# Patient Record
Sex: MALE | Race: WHITE
[De-identification: names, ages, dates, MRNs, and addresses within clinical notes are randomized per-mention and may not be internally consistent; named-entity substitution may affect disease eponyms.]

---

## 2018-09-14 ENCOUNTER — HOSPITAL ENCOUNTER (INPATIENT)
Dept: HOSPITAL 92 - SCSER | Age: 28
LOS: 3 days | Discharge: HOME | DRG: 394 | End: 2018-09-17
Attending: SURGERY | Admitting: SURGERY
Payer: COMMERCIAL

## 2018-09-14 VITALS — BODY MASS INDEX: 28.7 KG/M2

## 2018-09-14 DIAGNOSIS — K56.609: ICD-10-CM

## 2018-09-14 DIAGNOSIS — Z88.8: ICD-10-CM

## 2018-09-14 DIAGNOSIS — T18.3XXA: Primary | ICD-10-CM

## 2018-09-14 LAB
ALBUMIN SERPL BCG-MCNC: 4.5 G/DL (ref 3.5–5)
ALP SERPL-CCNC: 44 U/L (ref 40–150)
ALT SERPL W P-5'-P-CCNC: 31 U/L (ref 8–55)
ANION GAP SERPL CALC-SCNC: 14 MMOL/L (ref 10–20)
AST SERPL-CCNC: 25 U/L (ref 5–34)
BASOPHILS # BLD AUTO: 0.1 THOU/UL (ref 0–0.2)
BASOPHILS NFR BLD AUTO: 1.1 % (ref 0–1)
BILIRUB SERPL-MCNC: 0.4 MG/DL (ref 0.2–1.2)
BUN SERPL-MCNC: 20 MG/DL (ref 8.9–20.6)
CALCIUM SERPL-MCNC: 9.7 MG/DL (ref 7.8–10.44)
CHLORIDE SERPL-SCNC: 107 MMOL/L (ref 98–107)
CO2 SERPL-SCNC: 24 MMOL/L (ref 22–29)
CREAT CL PREDICTED SERPL C-G-VRATE: 0 ML/MIN (ref 70–130)
EOSINOPHIL # BLD AUTO: 0.3 THOU/UL (ref 0–0.7)
EOSINOPHIL NFR BLD AUTO: 3.7 % (ref 0–10)
GLOBULIN SER CALC-MCNC: 2.7 G/DL (ref 2.4–3.5)
GLUCOSE SERPL-MCNC: 105 MG/DL (ref 70–105)
HGB BLD-MCNC: 15 G/DL (ref 14–18)
LIPASE SERPL-CCNC: 14 U/L (ref 8–78)
LYMPHOCYTES # BLD: 2 THOU/UL (ref 1.2–3.4)
LYMPHOCYTES NFR BLD AUTO: 27.1 % (ref 21–51)
MCH RBC QN AUTO: 30.1 PG (ref 27–31)
MCV RBC AUTO: 85.3 FL (ref 78–98)
MONOCYTES # BLD AUTO: 0.6 THOU/UL (ref 0.11–0.59)
MONOCYTES NFR BLD AUTO: 8.2 % (ref 0–10)
NEUTROPHILS # BLD AUTO: 4.4 THOU/UL (ref 1.4–6.5)
NEUTROPHILS NFR BLD AUTO: 60 % (ref 42–75)
PLATELET # BLD AUTO: 290 THOU/UL (ref 130–400)
POTASSIUM SERPL-SCNC: 3.8 MMOL/L (ref 3.5–5.1)
RBC # BLD AUTO: 5 MILL/UL (ref 4.7–6.1)
SODIUM SERPL-SCNC: 141 MMOL/L (ref 136–145)
WBC # BLD AUTO: 7.4 THOU/UL (ref 4.8–10.8)

## 2018-09-14 PROCEDURE — 85025 COMPLETE CBC W/AUTO DIFF WBC: CPT

## 2018-09-14 PROCEDURE — 96374 THER/PROPH/DIAG INJ IV PUSH: CPT

## 2018-09-14 PROCEDURE — 80053 COMPREHEN METABOLIC PANEL: CPT

## 2018-09-14 PROCEDURE — 96361 HYDRATE IV INFUSION ADD-ON: CPT

## 2018-09-14 PROCEDURE — 80048 BASIC METABOLIC PNL TOTAL CA: CPT

## 2018-09-14 PROCEDURE — 74250 X-RAY XM SM INT 1CNTRST STD: CPT

## 2018-09-14 PROCEDURE — 96375 TX/PRO/DX INJ NEW DRUG ADDON: CPT

## 2018-09-14 PROCEDURE — 74177 CT ABD & PELVIS W/CONTRAST: CPT

## 2018-09-14 PROCEDURE — 96376 TX/PRO/DX INJ SAME DRUG ADON: CPT

## 2018-09-14 PROCEDURE — 83605 ASSAY OF LACTIC ACID: CPT

## 2018-09-14 PROCEDURE — 83690 ASSAY OF LIPASE: CPT

## 2018-09-14 PROCEDURE — 36415 COLL VENOUS BLD VENIPUNCTURE: CPT

## 2018-09-14 PROCEDURE — A4216 STERILE WATER/SALINE, 10 ML: HCPCS

## 2018-09-14 PROCEDURE — 74018 RADEX ABDOMEN 1 VIEW: CPT

## 2018-09-14 RX ADMIN — Medication SCH: at 08:34

## 2018-09-14 RX ADMIN — POTASSIUM CHLORIDE, DEXTROSE MONOHYDRATE AND SODIUM CHLORIDE SCH MLS: 150; 5; 450 INJECTION, SOLUTION INTRAVENOUS at 18:07

## 2018-09-14 RX ADMIN — Medication SCH: at 20:33

## 2018-09-14 NOTE — CT
PRELIMINARY REPORT/[facility]/EMERGENCY AFTER

HOURS PROCEDURE: 

 

EXAM:

CT Abdomen and Pelvis With Intravenous Contrast

 

EXAM DATE/TIME:

9/14/2018 1:31 AM

 

CLINICAL HISTORY:

28 years old, male; Pain; Abdominal pain; Epigastric; Prior surgery; Surgery date: 6+ months; Surgery
 type: Lap bowel decompression 2014; Patient HX: Epigastric and middle abd pain, distention. Hs of mu
ltiple obstructions since birth, seven surgeries but no bowel removed

 

TECHNIQUE:

Axial computed tomography images of the abdomen and pelvis with intravenous contrast. All CT scans at
 this facility use at least one of these dose optimization techniques: automated exposure control; mA
 and/or kV adjustment per patient size (includes targeted exams where dose is

matched to clinical indication); or iterative reconstruction. Coronal reformatted images were created
 and reviewed.

 

CONTRAST:

90 ml of [id number] administered intravenously.

 

COMPARISON:

No relevant prior studies available.

 

FINDINGS:

Lower thorax: The visualized portions of the lung bases are normal.

ABDOMEN:

Liver: Normal. No mass.

Gallbladder and bile ducts: The gallbladder is normal. There is no evidence of biliary ductal dilatio
n.

Pancreas: The pancreas appears normal.

Spleen: The spleen is normal.

Adrenals: The adrenal glands are normal.

Kidneys and ureters: The kidneys appear normal.

Stomach and bowel: The stomach is normal. The duodenum is unremarkable. There is dilatation of the sm
all bowel up to 4.7 cm with abrupt collapse in the RIGHT upper quadrant consistent with small bowel o
bstruction.

Appendix: No appendix is specifically identified. No associated signs to suggest acute appendicitis.

PELVIS:

Bladder: The bladder is normal.

Reproductive: The prostate gland and seminal vesicles are normal.

ABDOMEN and PELVIS:

Intraperitoneal space: Normal. No free air. No significant fluid collection.

Bones/joints: No acute fracture. No dislocation.

Soft tissues: Unremarkable.

Vasculature: Normal. No abdominal aortic aneurysm.

Lymph nodes: Normal. No enlarged lymph nodes.

 

IMPRESSION:

There is dilatation of the small bowel up to 4.7 cm with abrupt collapse in the RIGHT upper quadrant 
consistent with small bowel obstruction.

 

Thank you for allowing us to participate in the care of your patient.

Dictated and Authenticated by: Molina Lopez MD

09/14/2018 3:24 AM Central Time ([place] & [place])

 

 

 

FINAL REPORT 

 

EMERGENT AFTER HOURS CT ABDOMEN AND PELVIS

 

DATE: 9/14/18.

 

HISTORY: 

Gastric abdominal pain.  History of prior surgery for bowel decompression in 2014.  Epigastric and mi
ddle abdominal pain as well as abdominal distention.  History of multiple bowel obstructions.

 

COMPARISON: 

8/3/17.

 

IMPRESSION: 

1.      Partial small bowel obstruction with loops of small bowel measuring up to 4.8 cm.  There is f
eculent-type material within the small bowel just proximal to the area of transition in the right low
er quadrant which could be related to phytobezoar.

2.      No other findings are seen in the abdomen or pelvis.

3.      Findings are in agreement with the preliminary report by [facility].

 

POS: [facility]

## 2018-09-14 NOTE — HP
DATE OF ADMISSION:  09/14/2018

 

CHIEF COMPLAINT:  Nausea, vomiting and abdominal pain.

 

HISTORY OF PRESENT ILLNESS:  The patient is a 25-year-old male who had a congenital malrotation of th
e midgut with Vienna's bands and has had multiple surgeries known as an infant, but throughout his life
, recurrent bowel obstructions.  His last admission was 2-1/2 years ago and he was treated nonoperati
vely.  He was doing well until yesterday afternoon when he started having some nausea, vomiting and a
bdominal pain similar to previous obstructions.  At midnight, he went to the emergency room.  He says
 he had a bowel movement at 8:30, he passed flatus at 9:00 p.m. last night.

 

PAST MEDICAL HISTORY:  Otherwise, healthy.

 

PAST SURGICAL HISTORY:  Multiple small bowel resections, exploratory laparotomies and stricturoplasty
.

 

MEDICATIONS:  He is on no medications.

 

ALLERGIES:  No known drug allergies.  He is allergic to GRASS.

 

SOCIAL HISTORY:  He is a graduate of [employer].  He does not use tobacco or alcohol.

 

FAMILY HISTORY:  He is adopted.

 

PHYSICAL EXAMINATION:

VITAL SIGNS:  Temperature 97.7, pulse 80, blood pressure 123/61.

ABDOMEN:  He has a hypertrophic midline scar.  There are no palpable hernias.  He is distended.  No s
ignificant tenderness.

EXTREMITIES:  Unremarkable.

 

LABORATORY DATA:  White count 7.4, H and H 15 and 42, platelet count 290,000.  Electrolytes are fine.


 

IMAGING:  CT scan shows a very distended stomach with vegetable matter as well as the duodenum with a
 cut off zone in the right upper quadrant.

 

ASSESSMENT:  Bezoar.  I confirmed that with the radiologist.

 

PLAN:  Continue NG suction.  We will consult GI.

## 2018-09-14 NOTE — CON
DATE OF CONSULTATION:  09/14/2018

 

REQUESTING PHYSICIAN:  Dr. Bear.

 

REASON FOR CONSULTATION:  Partial small-bowel obstruction, bezoar in the stomach and duodenum.

 

HISTORY OF PRESENT ILLNESS:  Clay Dennis is a 28-year-old man who has been seen in the past by my [facility] colleague, Dr. Adriano Willis.  He has a complicated history of multiple episodes of small-bowel obstru
ction dating back to infancy.  He has his first surgery at the age of 4 with Pedro's bands released at
 that time, but then throughout life since then has been dealing with intra-abdominal adhesive diseas
e.  Primarily recurrent bowel obstructions have been in the distal small bowel.  At one point, he had
 a strictureplasty and a small segmental distal small bowel resection.  He had multiple exploratory l
aparotomies.  He had been doing okay for several months, but yesterday he started having acute upper 
abdominal pain associated with nausea and vomiting.  His last bowel movement was last night and was n
ormal, but he has not had any bowel movement since that time.  The pain and the nausea became severe 
and he presented to the hospital.  A CT scan of the abdomen and pelvis demonstrates distended stomach
 and duodenum with a food bezoar essentially filling up the stomach and most of the duodenum, in fron
t of partial bowel obstruction.  The patient had a nasogastric tube placed, which has put out 100 mL 
of fluid and he has vomited 3 times today.  He continues to feel nauseated and have abdominal pain.  
He is receiving morphine and requesting Dilaudid.

 

PAST MEDICAL HISTORY:  Otherwise, healthy.

 

PAST SURGICAL HISTORY:  Multiple small bowel resections, exploratory laparotomies and stricturoplasty
.

 

MEDICATIONS:  None.

 

ALLERGIES:  No known drug allergies.

 

SOCIAL HISTORY:  No tobacco or alcohol use.

 

FAMILY HISTORY:  He is adopted.

 

PHYSICAL EXAMINATION:

VITAL SIGNS:  Temperature 97.8, pulse 70, blood pressure 130/73, 98% oxygen saturation on room air.

GENERAL:  A 28-year-old  man sitting up in bed in mild to moderate discomfort from nausea, b
ut no acute distress.

SKIN:  No jaundice, no rashes were palpable.

EYES:  No scleral icterus.  Extraocular movements intact.

ENT:  Mucous membranes moist.  He has a nasogastric tube suctioning out a small amount of thick fluid
.

HEART:  Regular rate and rhythm.

LUNGS:  Clear to auscultation bilaterally.

ABDOMEN:  Mild distention, tympanitic.  Bowel sounds are hypoactive, but present.  Generalized tender
ness to palpation, but no guarding, rebound tenderness.

EXTREMITIES:  No peripheral edema.

VESSELS:  Radial pulses 2+ bilaterally.

NEUROLOGICAL:  Cranial nerves II-XII intact bilaterally.  No focal deficits.

 

LABORATORY STUDIES:  WBC 7.4, hemoglobin 15.0, platelets 290.  Sodium 141, potassium 3.8, BUN 20, cre
atinine 1.24, lactic acid 0.6.  Total bilirubin 0.4, alkaline phosphatase 44, AST 25, ALT 31, lipase 
14, albumin 4.5.

 

ASSESSMENT AND PLAN:

1.  Proximal partial small-bowel obstruction, recurrent.

2.  Bezoar, filling the stomach and most of the duodenum, based on CT scan from earlier today.

 

I had a long discussion with the patient and his mother as well as his father on the speaker phone.  
Unfortunately, it is not really feasible to attempt endoscopic removal of bezoar at this size, which 
appears to be essentially filling up the entire stomach and most of the duodenum.  Rather, I am hopef
ul that this obstructive process will resolve as happened previously and that all of this food materi
al will be able to move distally appropriately.  I cannot really predict how fast this might occur.  
If not, then a surgical approach would be required.  Normally, I would recommend treating with IV Reg
imer to try to stimulate bowel motility and move this bezoar along, but in his case with prior episode
s of real mechanical small-bowel obstruction, this would be a bit higher risk.  But I would recommend
 conservative expectant management rather than anything aggressive at this time.  We will not plan fo
r any endoscopy.  Consider repeat imaging on a daily basis if symptoms persist.

 

Thank you for the consultation.  Please call with questions or concerns.

## 2018-09-15 LAB
ANION GAP SERPL CALC-SCNC: 8 MMOL/L (ref 10–20)
BASOPHILS # BLD AUTO: 0 THOU/UL (ref 0–0.2)
BASOPHILS NFR BLD AUTO: 0.2 % (ref 0–1)
BUN SERPL-MCNC: 11 MG/DL (ref 8.9–20.6)
CALCIUM SERPL-MCNC: 8.3 MG/DL (ref 7.8–10.44)
CHLORIDE SERPL-SCNC: 106 MMOL/L (ref 98–107)
CO2 SERPL-SCNC: 29 MMOL/L (ref 22–29)
CREAT CL PREDICTED SERPL C-G-VRATE: 114 ML/MIN (ref 70–130)
EOSINOPHIL # BLD AUTO: 0.3 THOU/UL (ref 0–0.7)
EOSINOPHIL NFR BLD AUTO: 4.1 % (ref 0–10)
GLUCOSE SERPL-MCNC: 102 MG/DL (ref 70–105)
HGB BLD-MCNC: 13.4 G/DL (ref 14–18)
LYMPHOCYTES # BLD: 1.1 THOU/UL (ref 1.2–3.4)
LYMPHOCYTES NFR BLD AUTO: 17.6 % (ref 21–51)
MCH RBC QN AUTO: 31.7 PG (ref 27–31)
MCV RBC AUTO: 91.4 FL (ref 78–98)
MONOCYTES # BLD AUTO: 0.7 THOU/UL (ref 0.11–0.59)
MONOCYTES NFR BLD AUTO: 10.6 % (ref 0–10)
NEUTROPHILS # BLD AUTO: 4.2 THOU/UL (ref 1.4–6.5)
NEUTROPHILS NFR BLD AUTO: 67.5 % (ref 42–75)
PLATELET # BLD AUTO: 239 THOU/UL (ref 130–400)
POTASSIUM SERPL-SCNC: 3.8 MMOL/L (ref 3.5–5.1)
RBC # BLD AUTO: 4.22 MILL/UL (ref 4.7–6.1)
SODIUM SERPL-SCNC: 139 MMOL/L (ref 136–145)
WBC # BLD AUTO: 6.2 THOU/UL (ref 4.8–10.8)

## 2018-09-15 RX ADMIN — Medication SCH: at 10:07

## 2018-09-15 RX ADMIN — Medication SCH: at 20:58

## 2018-09-15 RX ADMIN — POTASSIUM CHLORIDE, DEXTROSE MONOHYDRATE AND SODIUM CHLORIDE SCH MLS: 150; 5; 450 INJECTION, SOLUTION INTRAVENOUS at 17:46

## 2018-09-15 RX ADMIN — POTASSIUM CHLORIDE, DEXTROSE MONOHYDRATE AND SODIUM CHLORIDE SCH MLS: 150; 5; 450 INJECTION, SOLUTION INTRAVENOUS at 10:32

## 2018-09-15 RX ADMIN — POTASSIUM CHLORIDE, DEXTROSE MONOHYDRATE AND SODIUM CHLORIDE SCH MLS: 150; 5; 450 INJECTION, SOLUTION INTRAVENOUS at 02:14

## 2018-09-15 NOTE — RAD
ABDOMINAL RADIOGRAPH:

 

Date:  09/15/18 

 

PROVIDED CLINICAL HISTORY:   

Small bowel obstruction. 

 

FINDINGS:

The abdominal bowel gas pattern is nonspecific. Enteric catheter overlies the left upper quadrant. Th
e supine nature of the examination is not sensitive for the detection of pneumoperitoneum. No suspici
ous calcifications. 

 

IMPRESSION: 

Nonspecific bowel gas pattern. 

 

 

POS: [facility]

## 2018-09-15 NOTE — PRG
DATE OF SERVICE:  09/15/2018

 

SUBJECTIVE:  Mr. Dennis does feel better today.  He is less bloated.  NG output is down.

 

OBJECTIVE:

VITAL SIGNS:  He is afebrile.  Vital signs are stable.  _____ gastric drainage overnight.

ABDOMEN:  Soft, it is minimally distended, very little tenderness.  Decreased bowel sounds throughout
.  Well-healed incision without hernia.

 

LABORATORY DATA:  White cell count is 6, hemoglobin 13, creatinine 1.1.  Sodium 139, potassium is 3.8
.

 

ASSESSMENT:  Small-bowel obstruction, question secondary to phytobezoar.

 

PLAN:  Gastrografin small bowel follow through today.  The family notes that in previous hospitalizat
ions, he has required minimal oil as an adjunct to that.  We will attempt without first, but use it i
f we need to.  Hopeful that his phytobezoar can be flushed through with Gastrografin.

## 2018-09-15 NOTE — RAD
GASTROGRAFIN SMALL BOWEL FOLLOW THROUGH:

 

Date:  09/15/18 

 

PROVIDED CLINICAL HISTORY:   

Small bowel obstruction. 

 

FINDINGS:

 radiograph demonstrates a nonspecific bowel gas pattern. Subsequent to the administration of or
al contrast material, there is rapid transit of contrast material from the stomach, with opacificatio
n of the colon by the 30 minute image. There is no nicanor small bowel dilatation apparent. 

 

IMPRESSION: 

No evidence for small bowel obstruction. 

 

 

POS: [facility]

## 2018-09-15 NOTE — PRG
DATE OF SERVICE:  09/15/2018

 

SUBJECTIVE:  Mr. Dennis is feeling a lot better this morning.  He had no further vomiting after yest
erday.  He continued to pass some gas.  He woke up this morning with no nausea or abdominal pain.  He
 did have a small bowel follow through earlier today.  The report just came back.  The small bowel fo
llow through was essentially normal.  There is no evidence of obstruction.  No clear evidence of any 
residual food matter.

 

OBJECTIVE:

VITAL SIGNS:  Temperature 98.0, pulse 69, blood pressure 123/77, 96% oxygen saturation on room air.

GENERAL:  No acute distress.  Nasogastric tube remains in place, clamped.

HEART:  Regular rate and rhythm.

LUNGS:  Clear to auscultation bilaterally.

ABDOMEN:  The patient does have bowel sounds in all 4 quadrants, a bit hypoactive, but present.  Soft
 and nontender to palpation.

EXTREMITIES:  No peripheral edema.

 

LABORATORY STUDIES:  WBC 6.2, hemoglobin 13.4, platelets 239.  Sodium 139, potassium 3.8, BUN 11, cre
atinine 1.10.  Calcium 8.3.

 

IMAGING STUDIES:  Small bowel follow through from earlier today demonstrated nonspecific bowel gas pa
ttern.  There is rapid transit of contrast material from the stomach with opacification of the colon 
by the 30-minute image.  There is no nicanor small bowel dilation apparent on my review of the images, 
I do not see any filling defect suggestive of retained food material.

 

ASSESSMENT AND PLAN:

1.  Small-bowel obstruction, appears to have resolved.

2.  Bezoar, appears to have passed.  The patient is doing a lot better and this is consistent with 
all bowel follow through findings from today.  I think his diet could be started.  We would start wit
h clear liquids and advance as tolerated.  Hopefully, if he is tolerating food, he will be able to be
 discharged from the hospital in the near future.  I discussed with Dr. Fay as well.  Going forwa
rd, I think the patient would benefit from a generally adhering to a low residue diet.  Otherwise, no
 other GI recommendations at this time.

 

GI will sign off, but please call back anytime with questions or concerns.

## 2018-09-16 RX ADMIN — POTASSIUM CHLORIDE, DEXTROSE MONOHYDRATE AND SODIUM CHLORIDE SCH: 150; 5; 450 INJECTION, SOLUTION INTRAVENOUS at 08:24

## 2018-09-16 RX ADMIN — Medication SCH: at 08:24

## 2018-09-16 RX ADMIN — POTASSIUM CHLORIDE, DEXTROSE MONOHYDRATE AND SODIUM CHLORIDE SCH MLS: 150; 5; 450 INJECTION, SOLUTION INTRAVENOUS at 03:44

## 2018-09-16 RX ADMIN — Medication SCH ML: at 20:55

## 2018-09-16 RX ADMIN — POTASSIUM CHLORIDE, DEXTROSE MONOHYDRATE AND SODIUM CHLORIDE SCH: 150; 5; 450 INJECTION, SOLUTION INTRAVENOUS at 15:11

## 2018-09-16 NOTE — PDOC.GSPN
Surgery Progress Note: Subj





- Subjective


Patient reports: bowel movement, flatus, tolerating a regular diet





Surgery Progress Note: Obj





- Vital signs


Vital signs: 


 Vital Signs - Most Recent











Temp Pulse Resp BP Pulse Ox


 


 98.0 F   68   16   123/72   97 


 


 09/16/18 06:57  09/16/18 06:57  09/16/18 06:57  09/16/18 06:57  09/16/18 06:57














- Physical Exam


General: no distress


Cardiovascular: regular rate and rhythm


Respiratory: clear to auscultation


Abdomen: soft, non tender, nondistended, positive bowel sounds





Surgery Progress Note: Results





- Labs


Result Diagrams: 


 09/15/18 06:48





 09/15/18 06:48





Surgery Progress Note: A/P





- Problem


(1) Small bowel obstruction


Current Visit: Yes   Code(s): K56.609 - UNSP INTESTNL OBST, UNSP AS TO PARTIAL 

VERSUS COMPLETE OBST   Status: Acute   





- Plan


Plan: 





Resolved


-SBFT normal


-advance diet today


-likely home tomorrow

## 2018-09-17 VITALS — SYSTOLIC BLOOD PRESSURE: 121 MMHG | TEMPERATURE: 97.8 F | DIASTOLIC BLOOD PRESSURE: 76 MMHG

## 2018-09-17 RX ADMIN — Medication SCH: at 08:57

## 2018-09-17 RX ADMIN — POTASSIUM CHLORIDE, DEXTROSE MONOHYDRATE AND SODIUM CHLORIDE SCH: 150; 5; 450 INJECTION, SOLUTION INTRAVENOUS at 02:23

## 2018-09-17 RX ADMIN — POTASSIUM CHLORIDE, DEXTROSE MONOHYDRATE AND SODIUM CHLORIDE SCH: 150; 5; 450 INJECTION, SOLUTION INTRAVENOUS at 08:56

## 2018-09-17 NOTE — DIS
DATE OF ADMISSION:  09/14/2018

 

DATE OF DISCHARGE:  09/17/2018

 

DISCHARGE DIAGNOSIS:  Gastroduodenal bezoar with obstruction.

 

PROCEDURES DURING ADMISSION:  Small bowel follow through with Gastrografin.

 

HOSPITAL COURSE:  The patient was admitted, given IV fluids, NG tube was placed.  Minimal came out th
e NG tube.  He started passing flatus.  He did vomit some of this vegetable material.  A small bowel 
follow through with Gastrografin was performed showed a complete resolution and no further vegetable 
matter within the stomach or duodenum.  He has been passing multiple bowel movements.  He feels good.
  He is tolerating a regular diet.  He is discharged home on a low residue diet.  He will follow up w
[facility] dietitian for further recommendations.